# Patient Record
Sex: MALE | Race: WHITE | NOT HISPANIC OR LATINO | Employment: FULL TIME | URBAN - METROPOLITAN AREA
[De-identification: names, ages, dates, MRNs, and addresses within clinical notes are randomized per-mention and may not be internally consistent; named-entity substitution may affect disease eponyms.]

---

## 2023-12-06 NOTE — PROGRESS NOTES
"FUV    Last seen - 12/9/22     HISTORY OF PRESENT ILLNESS:   Earl Lyles is a 73 y.o. 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, treated with Mitomycin C   -PSA was 1.97 on 1/9/23 and 1.96 on 10/15/2020     PAST MEDICAL HISTORY:  Past Medical History:   Diagnosis Date    Personal history of other diseases of the nervous system and sense organs     History of deafness    Personal history of other diseases of the respiratory system     History of acute bronchitis    Personal history of other drug therapy     History of influenza vaccination    Unspecified blepharitis right eye, unspecified eyelid 10/17/2015    Blepharitis of both eyes       PAST SURGICAL HISTORY:  Past Surgical History:   Procedure Laterality Date    OTHER SURGICAL HISTORY  12/17/2013    Cystoscopy With Resection Of Tumor        ALLERGIES:   No Known Allergies     MEDICATIONS:   No current outpatient medications     PHYSICAL EXAM:  There were no vitals taken for this visit.  Constitutional: Patient appears well-developed and well-nourished. No distress.    Pulmonary/Chest: Effort normal. No respiratory distress.   Abdominal: Soft, ND NT  : WNL  Musculoskeletal: Normal range of motion.    Neurological: Alert and oriented to person, place, and time.  Psychiatric: Normal mood and affect. Behavior is normal. Thought content normal.      Labs:  No results found for: \"TESTOSTERONE\"  Lab Results   Component Value Date    PSA 1.97 01/09/2023     No components found for: \"CBC\"  Lab Results   Component Value Date    CREATININE 0.91 10/15/2020     No components found for: \"TESTOTMS\"  No results found for: \"TESTF\"    Imaging:    Discussion: Cystoscopy was completed today due to TCC surveillance and was unremarkable. No discrete tumors or lesions were noted. Cytology sent. 1 abx given. Patient notes urinary frequency. Denies straining or pushing to urinate. Offered to try low dose " Cialis 5mg. Denies nitrates. R/b/a's discussed. If sx do not improve in a month, call us back and we will try a different medication. Otherwise, follow up in 1 year for continued cystoscopic surveillance. All questions and concerns were addressed. Patient verbalizes understanding and has no other questions at this time.      Assessment:      1. Malignant neoplasm of urinary bladder, unspecified site (CMS/HCC)  Cytology Consultation (Non-Gynecologic)    POCT UA Automated manually resulted      2. Prophylactic antibiotic  ciprofloxacin (Cipro) tablet 500 mg          Earl Lyles is a 73 y.o. male here for FUV     Plan:   1) Follow up in 1 year for cystoscopy.   2) Rx daily dosing Cialis.   All questions and concerns were addressed. Patient verbalizes understanding and has no other questions at this time.     Scribe Attestation  By signing my name below, IKarina Scribe   attest that this documentation has been prepared under the direction and in the presence of Aleksandr Sharma MD.

## 2023-12-08 ENCOUNTER — APPOINTMENT (OUTPATIENT)
Dept: LAB | Facility: LAB | Age: 73
End: 2023-12-08
Payer: MEDICARE

## 2023-12-08 ENCOUNTER — PROCEDURE VISIT (OUTPATIENT)
Dept: UROLOGY | Facility: HOSPITAL | Age: 73
End: 2023-12-08
Payer: MEDICARE

## 2023-12-08 DIAGNOSIS — C67.9 MALIGNANT NEOPLASM OF URINARY BLADDER, UNSPECIFIED SITE (MULTI): ICD-10-CM

## 2023-12-08 DIAGNOSIS — R35.0 URINARY FREQUENCY: ICD-10-CM

## 2023-12-08 DIAGNOSIS — Z79.2 PROPHYLACTIC ANTIBIOTIC: ICD-10-CM

## 2023-12-08 LAB
POC APPEARANCE, URINE: CLEAR
POC BILIRUBIN, URINE: NEGATIVE
POC BLOOD, URINE: NEGATIVE
POC COLOR, URINE: YELLOW
POC GLUCOSE, URINE: ABNORMAL MG/DL
POC KETONES, URINE: ABNORMAL MG/DL
POC LEUKOCYTES, URINE: NEGATIVE
POC NITRITE,URINE: NEGATIVE
POC PH, URINE: 5.5 PH
POC PROTEIN, URINE: NEGATIVE MG/DL
POC SPECIFIC GRAVITY, URINE: 1.01
POC UROBILINOGEN, URINE: 0.2 EU/DL

## 2023-12-08 PROCEDURE — 88112 CYTOPATH CELL ENHANCE TECH: CPT | Mod: TC | Performed by: UROLOGY

## 2023-12-08 PROCEDURE — 52000 CYSTOURETHROSCOPY: CPT | Performed by: UROLOGY

## 2023-12-08 PROCEDURE — 88112 CYTOPATH CELL ENHANCE TECH: CPT | Performed by: PATHOLOGY

## 2023-12-08 PROCEDURE — 81003 URINALYSIS AUTO W/O SCOPE: CPT | Performed by: UROLOGY

## 2023-12-08 RX ORDER — CIPROFLOXACIN 500 MG/1
500 TABLET ORAL ONCE
Status: SHIPPED | OUTPATIENT
Start: 2023-12-08

## 2023-12-08 RX ORDER — TADALAFIL 5 MG/1
5 TABLET ORAL DAILY
Qty: 90 TABLET | Refills: 3 | Status: CANCELLED | OUTPATIENT
Start: 2023-12-08 | End: 2024-12-02

## 2023-12-08 RX ORDER — TADALAFIL 5 MG/1
5 TABLET ORAL DAILY
Qty: 90 TABLET | Refills: 3 | Status: SHIPPED | OUTPATIENT
Start: 2023-12-08 | End: 2024-12-02

## 2023-12-09 ENCOUNTER — HOSPITAL ENCOUNTER (OUTPATIENT)
Dept: RADIOLOGY | Facility: HOSPITAL | Age: 73
Discharge: HOME | End: 2023-12-09
Payer: MEDICARE

## 2023-12-09 DIAGNOSIS — C67.9 MALIGNANT NEOPLASM OF BLADDER, UNSPECIFIED (MULTI): ICD-10-CM

## 2023-12-09 PROCEDURE — 76770 US EXAM ABDO BACK WALL COMP: CPT | Performed by: STUDENT IN AN ORGANIZED HEALTH CARE EDUCATION/TRAINING PROGRAM

## 2023-12-09 PROCEDURE — 76770 US EXAM ABDO BACK WALL COMP: CPT

## 2023-12-11 LAB
LABORATORY COMMENT REPORT: NORMAL
LABORATORY COMMENT REPORT: NORMAL
PATH REPORT.FINAL DX SPEC: NORMAL
PATH REPORT.GROSS SPEC: NORMAL
PATH REPORT.RELEVANT HX SPEC: NORMAL
PATH REPORT.TOTAL CANCER: NORMAL

## 2023-12-15 ENCOUNTER — APPOINTMENT (OUTPATIENT)
Dept: UROLOGY | Facility: HOSPITAL | Age: 73
End: 2023-12-15
Payer: MEDICARE

## 2024-11-29 ENCOUNTER — HOSPITAL ENCOUNTER (OUTPATIENT)
Dept: RADIOLOGY | Facility: HOSPITAL | Age: 74
Discharge: HOME | End: 2024-11-29
Payer: MEDICARE

## 2024-11-29 DIAGNOSIS — C67.9 MALIGNANT NEOPLASM OF URINARY BLADDER, UNSPECIFIED SITE (MULTI): ICD-10-CM

## 2024-11-29 PROCEDURE — 76770 US EXAM ABDO BACK WALL COMP: CPT

## 2024-12-06 ENCOUNTER — APPOINTMENT (OUTPATIENT)
Dept: UROLOGY | Facility: HOSPITAL | Age: 74
End: 2024-12-06
Payer: COMMERCIAL

## 2024-12-15 NOTE — PROGRESS NOTES
"FUV    Last seen - 12/8/23     HISTORY OF PRESENT ILLNESS:   Earl Lyles is a 74 y.o. male who is being seen today for cystoscopy.  Has been experiencing no urinary symptoms, no hematuria.  Recent PSA 1.97.    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, treated with Mitomycin C   -PSA was 1.97 on 1/9/23 and 1.96 on 10/15/2020     PAST MEDICAL HISTORY:  Past Medical History:   Diagnosis Date    Personal history of other diseases of the nervous system and sense organs     History of deafness    Personal history of other diseases of the respiratory system     History of acute bronchitis    Personal history of other drug therapy     History of influenza vaccination    Unspecified blepharitis right eye, unspecified eyelid 10/17/2015    Blepharitis of both eyes       PAST SURGICAL HISTORY:  Past Surgical History:   Procedure Laterality Date    OTHER SURGICAL HISTORY  12/17/2013    Cystoscopy With Resection Of Tumor        ALLERGIES:   No Known Allergies     MEDICATIONS:   Current Outpatient Medications   Medication Instructions    tadalafil (CIALIS) 5 mg, oral, Daily        PHYSICAL EXAM:  There were no vitals taken for this visit.  Constitutional: Patient appears well-developed and well-nourished. No distress.    Pulmonary/Chest: Effort normal. No respiratory distress.   Abdominal: Soft, ND NT  : WNL  Musculoskeletal: Normal range of motion.    Neurological: Alert and oriented to person, place, and time.  Psychiatric: Normal mood and affect. Behavior is normal. Thought content normal.      Labs:  No results found for: \"TESTOSTERONE\"  Lab Results   Component Value Date    PSA 1.97 01/09/2023     No components found for: \"CBC\"  Lab Results   Component Value Date    CREATININE 0.91 10/15/2020     No components found for: \"TESTOTMS\"  No results found for: \"TESTF\"    Imaging:    Discussion: Cystoscopy was completed today due to TCC surveillance and was unremarkable. No discrete tumors or " lesions were noted. Cytology sent. 1 abx given. No urinary complaints. Cialis is working well, provided refill 5 mg #90.  Otherwise, follow up in 1 year for continued cystoscopic surveillance. All questions and concerns were addressed. Patient verbalizes understanding and has no other questions at this time.      Assessment:      Cystoscopy performed today.  The patient tolerated the procedure well without complications or complaints.      Earl Lyles is a 74 y.o. male here for FUV     Plan:   1) Follow up in 1 year for cystoscopy.   2) Rx daily dosing Cialis, 5 mg #90,  refill provided.    All questions and concerns were addressed. Patient verbalizes understanding and has no other questions at this time.     Scribe Attestation  By signing my name below, ICorin Scribe   attest that this documentation has been prepared under the direction and in the presence of Aleksandr Sharma MD.

## 2024-12-18 RX ORDER — CIPROFLOXACIN 500 MG/1
500 TABLET ORAL ONCE
Status: COMPLETED | OUTPATIENT
Start: 2024-12-20 | End: 2024-12-20

## 2024-12-20 ENCOUNTER — LAB (OUTPATIENT)
Dept: LAB | Facility: LAB | Age: 74
End: 2024-12-20
Payer: COMMERCIAL

## 2024-12-20 ENCOUNTER — PROCEDURE VISIT (OUTPATIENT)
Dept: UROLOGY | Facility: HOSPITAL | Age: 74
End: 2024-12-20
Payer: MEDICARE

## 2024-12-20 VITALS — HEART RATE: 57 BPM | SYSTOLIC BLOOD PRESSURE: 161 MMHG | DIASTOLIC BLOOD PRESSURE: 74 MMHG

## 2024-12-20 DIAGNOSIS — Z79.2 PROPHYLACTIC ANTIBIOTIC: ICD-10-CM

## 2024-12-20 DIAGNOSIS — C67.9 MALIGNANT NEOPLASM OF URINARY BLADDER, UNSPECIFIED SITE (MULTI): ICD-10-CM

## 2024-12-20 PROCEDURE — 81003 URINALYSIS AUTO W/O SCOPE: CPT | Performed by: UROLOGY

## 2024-12-20 PROCEDURE — 52000 CYSTOURETHROSCOPY: CPT | Performed by: UROLOGY

## 2024-12-20 PROCEDURE — 2500000001 HC RX 250 WO HCPCS SELF ADMINISTERED DRUGS (ALT 637 FOR MEDICARE OP): Performed by: UROLOGY

## 2024-12-20 ASSESSMENT — PATIENT HEALTH QUESTIONNAIRE - PHQ9
1. LITTLE INTEREST OR PLEASURE IN DOING THINGS: NOT AT ALL
SUM OF ALL RESPONSES TO PHQ9 QUESTIONS 1 AND 2: 0
2. FEELING DOWN, DEPRESSED OR HOPELESS: NOT AT ALL

## 2025-06-20 ENCOUNTER — APPOINTMENT (OUTPATIENT)
Dept: UROLOGY | Facility: HOSPITAL | Age: 75
End: 2025-06-20
Payer: COMMERCIAL

## 2025-12-26 ENCOUNTER — APPOINTMENT (OUTPATIENT)
Dept: UROLOGY | Facility: HOSPITAL | Age: 75
End: 2025-12-26
Payer: COMMERCIAL